# Patient Record
Sex: FEMALE | URBAN - METROPOLITAN AREA
[De-identification: names, ages, dates, MRNs, and addresses within clinical notes are randomized per-mention and may not be internally consistent; named-entity substitution may affect disease eponyms.]

---

## 2017-10-18 ENCOUNTER — RECORDS - HEALTHEAST (OUTPATIENT)
Dept: LAB | Facility: HOSPITAL | Age: 52
End: 2017-10-18

## 2017-10-19 LAB — HBV SURFACE AB SERPL IA-ACNC: NEGATIVE M[IU]/ML

## 2018-12-04 ENCOUNTER — OFFICE VISIT - HEALTHEAST (OUTPATIENT)
Dept: FAMILY MEDICINE | Facility: CLINIC | Age: 53
End: 2018-12-04

## 2018-12-04 DIAGNOSIS — G44.209 TENSION HEADACHE: ICD-10-CM

## 2018-12-04 DIAGNOSIS — Z12.31 VISIT FOR SCREENING MAMMOGRAM: ICD-10-CM

## 2018-12-04 DIAGNOSIS — M48.07 FORAMINAL STENOSIS OF LUMBOSACRAL REGION: ICD-10-CM

## 2018-12-04 DIAGNOSIS — M48.07 SPINAL STENOSIS OF LUMBOSACRAL REGION: ICD-10-CM

## 2018-12-04 DIAGNOSIS — R42 VERTIGO: ICD-10-CM

## 2018-12-04 DIAGNOSIS — G57.02 PIRIFORMIS SYNDROME OF LEFT SIDE: ICD-10-CM

## 2018-12-04 ASSESSMENT — MIFFLIN-ST. JEOR: SCORE: 1356.58

## 2019-07-22 ENCOUNTER — COMMUNICATION - HEALTHEAST (OUTPATIENT)
Dept: SCHEDULING | Facility: CLINIC | Age: 54
End: 2019-07-22

## 2020-02-03 ENCOUNTER — RECORDS - HEALTHEAST (OUTPATIENT)
Dept: LAB | Facility: CLINIC | Age: 55
End: 2020-02-03

## 2020-02-04 LAB — HBV SURFACE AB SERPL IA-ACNC: NEGATIVE M[IU]/ML

## 2020-08-03 ENCOUNTER — COMMUNICATION - HEALTHEAST (OUTPATIENT)
Dept: SCHEDULING | Facility: CLINIC | Age: 55
End: 2020-08-03

## 2020-09-22 ENCOUNTER — COMMUNICATION - HEALTHEAST (OUTPATIENT)
Dept: FAMILY MEDICINE | Facility: CLINIC | Age: 55
End: 2020-09-22

## 2020-09-22 ENCOUNTER — COMMUNICATION - HEALTHEAST (OUTPATIENT)
Dept: SCHEDULING | Facility: CLINIC | Age: 55
End: 2020-09-22

## 2020-09-24 ENCOUNTER — AMBULATORY - HEALTHEAST (OUTPATIENT)
Dept: NURSING | Facility: CLINIC | Age: 55
End: 2020-09-24

## 2020-09-24 DIAGNOSIS — Z23 IMMUNIZATION DUE: ICD-10-CM

## 2021-01-15 ENCOUNTER — HEALTH MAINTENANCE LETTER (OUTPATIENT)
Age: 56
End: 2021-01-15

## 2021-04-20 ENCOUNTER — OFFICE VISIT - HEALTHEAST (OUTPATIENT)
Dept: FAMILY MEDICINE | Facility: CLINIC | Age: 56
End: 2021-04-20

## 2021-04-20 DIAGNOSIS — M54.16 LUMBAR BACK PAIN WITH RADICULOPATHY AFFECTING LEFT LOWER EXTREMITY: ICD-10-CM

## 2021-04-21 ENCOUNTER — OFFICE VISIT - HEALTHEAST (OUTPATIENT)
Dept: PHYSICAL THERAPY | Facility: REHABILITATION | Age: 56
End: 2021-04-21

## 2021-04-21 DIAGNOSIS — M54.16 LEFT LUMBAR RADICULOPATHY: ICD-10-CM

## 2021-04-23 ENCOUNTER — COMMUNICATION - HEALTHEAST (OUTPATIENT)
Dept: ADMINISTRATIVE | Facility: CLINIC | Age: 56
End: 2021-04-23

## 2021-04-27 ENCOUNTER — HOSPITAL ENCOUNTER (OUTPATIENT)
Dept: MRI IMAGING | Facility: CLINIC | Age: 56
Discharge: HOME | End: 2021-04-27

## 2021-04-27 ENCOUNTER — COMMUNICATION - HEALTHEAST (OUTPATIENT)
Dept: FAMILY MEDICINE | Facility: CLINIC | Age: 56
End: 2021-04-27

## 2021-04-27 DIAGNOSIS — M54.16 LUMBAR BACK PAIN WITH RADICULOPATHY AFFECTING LEFT LOWER EXTREMITY: ICD-10-CM

## 2021-04-28 ENCOUNTER — OFFICE VISIT - HEALTHEAST (OUTPATIENT)
Dept: PHYSICAL THERAPY | Facility: REHABILITATION | Age: 56
End: 2021-04-28

## 2021-04-28 ENCOUNTER — OFFICE VISIT - HEALTHEAST (OUTPATIENT)
Dept: FAMILY MEDICINE | Facility: CLINIC | Age: 56
End: 2021-04-28

## 2021-04-28 ENCOUNTER — COMMUNICATION - HEALTHEAST (OUTPATIENT)
Dept: FAMILY MEDICINE | Facility: CLINIC | Age: 56
End: 2021-04-28

## 2021-04-28 DIAGNOSIS — M54.16 LEFT LUMBAR RADICULOPATHY: ICD-10-CM

## 2021-04-28 DIAGNOSIS — M54.42 CHRONIC LEFT-SIDED LOW BACK PAIN WITH LEFT-SIDED SCIATICA: ICD-10-CM

## 2021-04-28 DIAGNOSIS — G89.29 CHRONIC LEFT-SIDED LOW BACK PAIN WITH LEFT-SIDED SCIATICA: ICD-10-CM

## 2021-04-30 ENCOUNTER — OFFICE VISIT - HEALTHEAST (OUTPATIENT)
Dept: PHYSICAL THERAPY | Facility: REHABILITATION | Age: 56
End: 2021-04-30

## 2021-04-30 DIAGNOSIS — M54.16 LEFT LUMBAR RADICULOPATHY: ICD-10-CM

## 2021-05-04 ENCOUNTER — COMMUNICATION - HEALTHEAST (OUTPATIENT)
Dept: FAMILY MEDICINE | Facility: CLINIC | Age: 56
End: 2021-05-04

## 2021-05-05 ENCOUNTER — OFFICE VISIT - HEALTHEAST (OUTPATIENT)
Dept: PHYSICAL THERAPY | Facility: REHABILITATION | Age: 56
End: 2021-05-05

## 2021-05-05 DIAGNOSIS — M54.16 LEFT LUMBAR RADICULOPATHY: ICD-10-CM

## 2021-05-07 ENCOUNTER — HOSPITAL ENCOUNTER (OUTPATIENT)
Dept: PHYSICAL MEDICINE AND REHAB | Facility: CLINIC | Age: 56
Discharge: HOME | End: 2021-05-07
Attending: PHYSICAL MEDICINE & REHABILITATION

## 2021-05-07 DIAGNOSIS — G47.9 SLEEP DIFFICULTIES: ICD-10-CM

## 2021-05-07 DIAGNOSIS — M47.816 ARTHROPATHY OF LUMBAR FACET JOINT: ICD-10-CM

## 2021-05-07 DIAGNOSIS — M54.16 LUMBAR RADICULAR PAIN: ICD-10-CM

## 2021-05-07 DIAGNOSIS — M43.16 SPONDYLOLISTHESIS OF LUMBAR REGION: ICD-10-CM

## 2021-05-07 DIAGNOSIS — M48.061 STENOSIS OF LATERAL RECESS OF LUMBAR SPINE: ICD-10-CM

## 2021-05-07 DIAGNOSIS — M79.18 MYOFASCIAL PAIN: ICD-10-CM

## 2021-05-07 RX ORDER — NABUMETONE 500 MG/1
500-1000 TABLET, FILM COATED ORAL 2 TIMES DAILY PRN
Qty: 90 TABLET | Refills: 2 | Status: SHIPPED | OUTPATIENT
Start: 2021-05-07

## 2021-05-07 RX ORDER — GABAPENTIN 100 MG/1
CAPSULE ORAL
Qty: 90 CAPSULE | Refills: 2 | Status: SHIPPED | OUTPATIENT
Start: 2021-05-07

## 2021-05-07 ASSESSMENT — MIFFLIN-ST. JEOR: SCORE: 1388.9

## 2021-05-12 ENCOUNTER — OFFICE VISIT - HEALTHEAST (OUTPATIENT)
Dept: PHYSICAL THERAPY | Facility: REHABILITATION | Age: 56
End: 2021-05-12

## 2021-05-12 DIAGNOSIS — M54.16 LEFT LUMBAR RADICULOPATHY: ICD-10-CM

## 2021-05-21 ENCOUNTER — OFFICE VISIT - HEALTHEAST (OUTPATIENT)
Dept: PHYSICAL THERAPY | Facility: REHABILITATION | Age: 56
End: 2021-05-21

## 2021-05-21 DIAGNOSIS — M54.16 LEFT LUMBAR RADICULOPATHY: ICD-10-CM

## 2021-05-26 ENCOUNTER — RECORDS - HEALTHEAST (OUTPATIENT)
Dept: ADMINISTRATIVE | Facility: CLINIC | Age: 56
End: 2021-05-26

## 2021-05-27 ENCOUNTER — RECORDS - HEALTHEAST (OUTPATIENT)
Dept: ADMINISTRATIVE | Facility: CLINIC | Age: 56
End: 2021-05-27

## 2021-05-27 VITALS — HEIGHT: 64 IN | BODY MASS INDEX: 30.22 KG/M2 | WEIGHT: 177 LBS

## 2021-05-30 NOTE — TELEPHONE ENCOUNTER
"Thursday night migraine.  Went away Friday.  Also had lower back pain, \"pelvic area\".  Pain comes and goes.  Now pain is in lower back.  Both sides.  Pain \"sometimew sin front, sometimes in back\".    No recent constipation. Denies urinary symptoms.    No fever.    She asks triage what do I think it is?  I say its not known and she needs to be seen today in clinic.  She says she will call her [facility] clinic by her home for an OV.    Does not rate pain as severe.  IF it gets severe, nurse recommends ED.    Elicia Servin, RN, [facility] Nurse Triage/Med Refills RN     Reason for Disposition    Patient wants to be seen    Protocols used: BACK PAIN-A-OH      "

## 2021-06-02 VITALS — HEIGHT: 64 IN | BODY MASS INDEX: 28.85 KG/M2 | WEIGHT: 169 LBS

## 2021-06-05 VITALS
DIASTOLIC BLOOD PRESSURE: 68 MMHG | HEART RATE: 74 BPM | WEIGHT: 174.8 LBS | BODY MASS INDEX: 30 KG/M2 | SYSTOLIC BLOOD PRESSURE: 110 MMHG

## 2021-06-10 NOTE — TELEPHONE ENCOUNTER
RN Triage:     Patient calling in stating that she has a headache, stuffy nose, sore throat, upset stomach and nauseated. Left lower right kidney is painful, she has a left side sciatic pain. NO pain with urination, no blood in her urine. Symptoms started 2-3 days ago and getting worse. Tightness in the chest. NO difficulty breathing NO fever, able to take a deep breath.   She is a CNA at [employer]. She works in [employer]. She was supposed to work today and called in sick.     Advised to have patient speak with employee health per work flow.   Patient given the number and transferred also.   Elisabeth Roque RN, BSN [facility] Triage Nurse      Reason for Disposition    Chest pain or pressure    Additional Information    Negative: SEVERE difficulty breathing (e.g., struggling for each breath, speaks in single words)    Negative: Difficult to awaken or acting confused (e.g., disoriented, slurred speech)    Negative: Bluish (or gray) lips or face now    Negative: Shock suspected (e.g., cold/pale/clammy skin, too weak to stand, low BP, rapid pulse)    Negative: Sounds like a life-threatening emergency to the triager    Negative: [1] COVID-19 exposure AND [2] no symptoms    Negative: COVID-19 and Breastfeeding, questions about    Negative: [1] Adult with possible COVID-19 symptoms AND [2] triager concerned about severity of symptoms or other causes    Negative: SEVERE or constant chest pain or pressure (Exception: mild central chest pain, present only when coughing)    Negative: MODERATE difficulty breathing (e.g., speaks in phrases, SOB even at rest, pulse 100-120)    Negative: Patient sounds very sick or weak to the triager    Negative: MILD difficulty breathing (e.g., minimal/no SOB at rest, SOB with walking, pulse <100)    Protocols used: CORONAVIRUS (COVID-19) DIAGNOSED OR [id number] 5.16.20

## 2021-06-11 NOTE — TELEPHONE ENCOUNTER
Who is calling:  Patient  Reason for Call:  Patient would like to cancel today's phone appt. However I am unable to cancel due to it being set to arrived already. Please assist with canceling appt.   Date of last appointment with primary care: na  Okay to leave a detailed message: No

## 2021-06-11 NOTE — TELEPHONE ENCOUNTER
Calling to prepare for telephone visit today.  Patient reports she spoke with PCP last week and does not need today's visit.   notified and appointment cancelled.

## 2021-06-11 NOTE — TELEPHONE ENCOUNTER
THANH attempted to cx but unable to do so.  Told JENNIFER Mcbride that pt is NOT having phone call today with Dr. Wood.  Understood. Thanks.

## 2021-06-16 NOTE — TELEPHONE ENCOUNTER
Reason for Call:  Form, our goal is to have forms completed with 72 hours, however, some forms may require a visit or additional information.    Type of letter, form or note:  disability    Who is the form from?: Insurance comp- [employer]    Where did the form come from: form was faxed in    What clinic location was the form placed at?:  Clinic    Where the form was placed: Should be given to provider    What number is listed as a contact on the form? [phone]       Additional comments: They are going to be faxing in a disability form for Diego to fill out, form will be faxed to [phone].    Call taken on 4/23/2021 at 12:28 PM by Johanna De Paz

## 2021-06-16 NOTE — PATIENT INSTRUCTIONS - HE
Call [phone] to set up the MRI of your spine.    I sent a medication called Valium into your pharmacy to take the day of your MRI.  Take the medication with you.  You will need to have somebody drive you home from your MRI.    Note given for work.  You can of the next 2 weeks off from work.  Drop off the [facility] paperwork for me to complete when you can.    Referral to physical therapy is in.  I encourage you to schedule an appointment before you leave the clinic today.  Otherwise, you can call [phone] to make an appointment.

## 2021-06-16 NOTE — PROGRESS NOTES
Assessment & Plan     Lumbar back pain with radiculopathy affecting left lower extremity  She has yet to have a baseline MRI.  We ordered that today.  Depending on results, we may want to consider a referral to the spine clinic.  For now, referral to physical therapy.  See patient instructions below for plan of care    - MR Lumbar Spine Without Contrast  - diazePAM (VALIUM) 5 MG tablet  Dispense: 2 tablet; Refill: 0  - Ambulatory referral to PT/OT    Patient Instructions   Call [phone] to set up the MRI of your spine.    I sent a medication called Valium into your pharmacy to take the day of your MRI.  Take the medication with you.  You will need to have somebody drive you home from your MRI.    Note given for work.  You can of the next 2 weeks off from work.  Drop off the [facility] paperwork for me to complete when you can.    Referral to physical therapy is in.  I encourage you to schedule an appointment before you leave the clinic today.  Otherwise, you can call [phone] to make an appointment.      Review of external notes as documented in note  15 minutes spent on the date of the encounter doing chart review, history and exam, documentation and further activities per the note       Return in about 3 months (around 7/20/2021).    Leonid Ruff, CNP  [facility]   Carina Rosado is 55 y.o. and presents today for the following health issues   HPI     Patient has a long established history of chronic low back pain.  There is a mention of lumbosacral foraminal stenosis in her chart but did not see any prior MRI documentation listed.    She saw her PCP at [facility] last year.  Physical therapy was ordered.  A referral for an MRI was placed.    She never had the MRI done.  She was given some exercises to do physical therapy and so limited efficacy from that.    She has had multiple prednisone bursts for her lumbosacral pain in the past but is unsure if these  did much for her as far as her pain is concerned.    She has some intermittent numbness/tingling in her left heel.  She denies any bowel or bladder incontinence.  No saddle anesthesia.    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  She is requesting some time off from work today.  She does not have [place] paperwork with her today    Review of Systems  Negative      Objective    /68   Pulse 74   Wt 174 lb 12.8 oz (79.3 kg)   BMI 30.00 kg/m    Body mass index is 30 kg/m .  Physical Exam  She has some pain to the left paraspinal musculature with some tenderness over the area associated to the left SI joint.

## 2021-06-17 NOTE — PATIENT INSTRUCTIONS - HE
1. Nabumetone (which is an anti-inflammatory) medication is prescribed today. Take 1-2 tablets 2 times a day as needed for pain. This medication should be taken with food and water to prevent any stomach upset. Do not take ibuprofen/Advil/Motrin/Aleve/naproxen while you take Nabumetone. Please call if you have any side effects.    2.A physical therapy order was provided for you today.  You can schedule physical therapy before you leave today or will be contacted by physical therapy.  If nobody contacts you within 3 to 5 days, please contact the clinic at [phone].  It will be very important for you to do your physical therapy exercises on a regular basis to decrease your pain and prevent future pain flares.    3. Work restriction for 1 month    4.  Prescribed Gabapentin today, 100 mg tablets, to be titrated up to 3 tablets at bedtime as tolerated for your nerve pain. Please follow Gabapentin dosing chart below. If your pain is still significant in the mornings, you may then start taking the medication in the morning and then an afternoon dose.    Gabapentin 100mg Dosing Chart    DATE  MORNING AFTERNOON BEDTIME    Day 1 0 0 1    Day 2 0 0 1    Day 3 0 0 1    Day 4 0 0 2    Day 5 0 0 2    Day 6 0 0 2    Day 7 0 0 3    Day 8 0 0 3    Day 9 0 0 3    Day 10    3    Day 11   3    Day 12   3    Day 13       Day 14       Day 15       Day 16       Day 17       Day 18       Day 19       Day 20       Day 21       Day 22       Day 23       Day 24       Day 25       Day 26       Day 27        Continue medication, taking 3 capsules at bedtime    Please call if you have any questions regarding how to take your medication  Clinic Phone # [phone]

## 2021-06-17 NOTE — PROGRESS NOTES
[facility] Daily Progress Note    Patient Name: Carina Rosado  Date: 2021  Visit #: 45PTA visit #:  -  Referral Diagnosis: L lumbar radiculopathy  Referring provider: Leonid Ruff CNP  Visit Diagnosis:     ICD-10-CM    1. Left lumbar radiculopathy  M54.16        Past Medical History:   Diagnosis Date     Depression      GERD (gastroesophageal reflux disease)          Assessment:     10' late to appt.    Pt demonstrates improvement with gait and transfers today. She tolerated session well without increase in pain.     Treatments to date have included: nerve glides, stretching, core strengthening, and manual therapy.    HEP/POC compliance is  good .  Patient demonstrates understanding/independence with home program.  Patient is benefitting from skilled physical therapy and is making steady progress toward functional goals.  Patient is appropriate to continue with skilled physical therapy intervention, as indicated by initial plan of care.    Goal Status:  Pt. will demonstrate/verbalize independence in self-management of condition in : 12 weeks  Pt. will be independent with home exercise program in : 6 weeks  Pt. will have improved quality of sleep: with less pain;waking less times/night;in 6 weeks    Patient will decrease : VANGIE score;by _ points;for improved quality of life;for improved quality of function;in 12 weeks  by ___ points: 6  Pt will: have negative neural tension testing into L LE within 8 weeks in order to improve her QOL.      Plan / Patient Education:     Continue with initial plan of care.  Progress with home program as tolerated.    Plan for next visit: treadmill, re-check SI. mobs, review HEP. abd set +single leg ext    Subjective:     Pain Ratin/10    Pain is still there. [place] gives her headache. She is still off of work but thinks she will go back light duty at some point.       Objective:     Slump: + slump on L NT    Lumbar AROM: NT  Flex: to distal tibia  Ext: WFL  R SB:  "WFL  L SB: WFL  B rot: WFL    Gait: normal, non-antalgic  Transfers: normal, non-antalgic    Response to MT: no increase in pain       Treatment Today:       Exercises:  Exercise #1: seated nerve glides x20 on L  Comment #1: femoral n glides x20 on L  Exercise #2: SKTC and modified piriformis stretch 30\" holds x2-3 B  Comment #2: child's pose 30\" holds x3-terminated due to increase in pain  Exercise #3: standing L ext x10 with 5\" holds  Comment #3: clamshell 5' holds, x15-20 B  Exercise #4: supine hip add x20 with 5\" Holds       TREATMENT MINUTES COMMENTS   Evaluation     Self-care/ Home management     Manual therapy 8 In R S/L: Gr I-II lumbar rotational mobs. MFR to L glut/QL/lumbar paraspinals   Neuromuscular Re-education     Therapeutic Activity     Therapeutic Exercises 10 Discussed progress and activity modification at home. Treadmill at self-selected speed of 1.9 mph for subj info (not today as patient wore sandals into therapy. Instructed pt to wear tennis shoes to following appts). Reviewed HEP- see flow sheet for details.   Gait training     Modality__________________     Performance Test           Total 18    Blank areas are intentional and mean the treatment did not include these items.       Shanice Rand, PT, DPT  5/12/2021    "

## 2021-06-17 NOTE — PROGRESS NOTES
[facility] Daily Progress Note    Patient Name: Carina Rosado  Date: 2021  Visit #: 5  PTA visit #:  -  Referral Diagnosis: L lumbar radiculopathy  Referring provider: Leonid Ruff CNP  Visit Diagnosis:     ICD-10-CM    1. Left lumbar radiculopathy  M54.16        Past Medical History:   Diagnosis Date     Depression      GERD (gastroesophageal reflux disease)          Assessment:     Patient reports about 30% progress since beginning therapy. She would like to hold therapy, work on her exercises, and then schedule more visits later on. Is sleeping better and having less stiffness in her neck. Patient is returning to work part-time  with restrictions. Sees her doctor in  but doesn't know when. Patient instructed to follow up with PT when she knows her schedule.    Treatments to date have included: nerve glides, stretching, core strengthening, and manual therapy.    HEP/POC compliance is  good .  Patient demonstrates understanding/independence with home program.  Patient is benefitting from skilled physical therapy and is making steady progress toward functional goals.  Patient is appropriate to continue with skilled physical therapy intervention, as indicated by initial plan of care.    Goal Status:  Pt. will demonstrate/verbalize independence in self-management of condition in : 12 weeks;Progressing toward  Pt. will be independent with home exercise program in : 6 weeks;Met  Pt. will have improved quality of sleep: with less pain;waking less times/night;in 6 weeks;Met    Patient will decrease : VANGIE score;by _ points;for improved quality of life;for improved quality of function;in 12 weeks;Met  by ___ points: 6  Pt will: have negative neural tension testing into L LE within 8 weeks in order to improve her QOL. GOAL NOT MET      Plan / Patient Education:     Continue with initial plan of care.  Progress with home program as tolerated.        Subjective:     Pain Ratin/10 in low back,  "6-7/10 in L knee    Feeling a little better. Knee and back hurt. Going back to work 5/27 part-time with restrictions (isn't sure exactly what her restrictions are). Pt would like to work on her exercises and then schedule therapy after that. Sees doctor in June.      Objective:     Slump: + slump on L     Lumbar AROM: all WFL and pain-free except SB which increases some low back stiffness  Gait: normal, non-antalgic  Transfers: normal, non-antalgic    Modified Oswestry Low Back Pain Disablity Questionnaire  in %: 34  (prev VANGIE 34%)    Response to MT: no increase in pain       Treatment Today:       Exercises:  Exercise #1: seated nerve glides x20 on L  Comment #1: femoral n glides x20 on L  Exercise #2: SKTC and modified piriformis stretch 30\" holds x2-3 B  Comment #2: child's pose 30\" holds x3-terminated due to increase in pain  Exercise #3: standing L ext x10 with 5\" holds  Comment #3: clamshell 5' holds, x15-20 B  Exercise #4: supine hip add x20 with 5\" Holds       TREATMENT MINUTES COMMENTS   Evaluation     Self-care/ Home management     Manual therapy 12 In R S/L: Gr I-II lumbar rotational mobs. MFR to L glut/QL/lumbar paraspinals   Neuromuscular Re-education     Therapeutic Activity     Therapeutic Exercises 11 Discussed progress and activity modification at home. Treadmill at self-selected speed for warm-up and taking subj info. obj measures taken.   Gait training     Modality__________________     Performance Test           Total 23    Blank areas are intentional and mean the treatment did not include these items.       Shanice Rand, PT, DPT  5/21/2021    "

## 2021-06-17 NOTE — PROGRESS NOTES
Carina Rosado is a 55 y.o. female who is being evaluated via a billable telephone visit.      What phone number would you like to be contacted at? [phone]  How would you like to obtain your AVS? AVS Preference: Edgardo.    Assessment & Plan     Chronic left-sided low back pain with left-sided sciatica  We reviewed the results of her MRI today.  She is interested in referral to the spine clinic.  I encouraged her to continue with physical therapy.  - Ambulatory referral to [facility] [place]      Review of the result(s) of each unique test - MRI  10 minutes spent on the date of the encounter doing chart review, history and exam, documentation and further activities per the note       No follow-ups on file.    Leonid Ruff, [facility]    Subjective   Carina Rosado is 55 y.o. and presents today for the following health issues   HPI     Patient presents the clinic today for follow-up of her low back pain.    I saw her approximately 1 week ago.  Please see my note from 4/20 for more details on the specifics regarding the history of her back pain.    Today, patient states that she has had 2 sessions of physical therapy.  She is unsure if she is seen much benefit yet.    She did have the MRI of her lumbar spine, which I reviewed with her today.  There was evidence of multilevel spondylotic change, most prominent at L4-L5 where there was felt to be moderate to severe spinal canal stenosis.    She would like a note for work so that she can have an additional 2 weeks of rest.  She knows that she needs to avoid any prolonged sitting or laying, and stay as active as she can.    She is interested in a referral to the spine clinic.     Review of Systems  Negative          Phone call duration: 10 minutes

## 2021-06-17 NOTE — TELEPHONE ENCOUNTER
I tried to call and discuss the patient's MRI results with her.    If the patient calls back, please help her schedule a telephone visit with me so that we can discuss her MRI results and neck steps for her back pain

## 2021-06-17 NOTE — PROGRESS NOTES
[facility] Daily Progress Note    Patient Name: Carina Rosado  Date: 2021  Visit #: 2  PTA visit #:  -  Referral Diagnosis: L lumbar radiculopathy  Referring provider: Leonid Ruff CNP  Visit Diagnosis:     ICD-10-CM    1. Left lumbar radiculopathy  M54.16        No past medical history on file.      Assessment:   4' late to appt    Patient requiring max review of her 2 exercises today. She is only doing a few repetitions of each and only 2x/day (not as instructed). She is only icing 1x/day. We discussed the importance of compliance with her HEP, otherwise they will likely not bring her any relief of her symptoms. Progressed her HEP today with some gentle stretching. She states that she is now off of work for 2 weeks on disability.      HEP/POC compliance is  poor.  Patient demonstrates understanding/independence with home program.  Patient is benefitting from skilled physical therapy and is making steady progress toward functional goals.  Patient is appropriate to continue with skilled physical therapy intervention, as indicated by initial plan of care.    Goal Status:  Pt. will demonstrate/verbalize independence in self-management of condition in : 12 weeks  Pt. will be independent with home exercise program in : 6 weeks  Pt. will have improved quality of sleep: with less pain;waking less times/night;in 6 weeks    Patient will decrease : VANGIE score;by _ points;for improved quality of life;for improved quality of function;in 12 weeks  by ___ points: 6  Pt will: have negative neural tension testing into L LE within 8 weeks in order to improve her QOL.      Plan / Patient Education:     Continue with initial plan of care.  Progress with home program as tolerated.    Plan for next visit: treadmill, review HEP, mobs, abd set +single leg ext, clamshell    Subjective:     Pain Ratin/10    Pain in L leg and L arm today. Exercises are going well. Doing her HEP 2x/day and only 5-10 reps. Icing  "1x/day      Objective:     Slump: + slump on L  Lumbar AROM:  Flex to distal tibia, +gowlers  Ext: WFL, pain-free  R SB: WFL, pain-free  L SB: increase in pain, min-mod limited  B rot: WFL, pain-free    Max VC needed for review of HEP today      Treatment Today:       Exercises:  Exercise #1: seated nerve glides x20 on L  Comment #1: femoral n glides x20 on L  Exercise #2: SKTC and modified piriformis stretch 30\" holds x2-3 B  Comment #2: child's pose 30\" holds x3-terminated due to increase in pain  Exercise #3: standing L ext x10 with 5\" holds       TREATMENT MINUTES COMMENTS   Evaluation     Self-care/ Home management     Manual therapy     Neuromuscular Re-education     Therapeutic Activity     Therapeutic Exercises 23 Discussed progress. Objective measures taken. Progressed HEP- see flow sheet for details. Discussed the importance again of doing her HEP 5x/day. Reviewed HEP extensively. Treadmill at self-selected speed of 1.9 mph for subj info.   Gait training     Modality__________________     Performance Test           Total 23    Blank areas are intentional and mean the treatment did not include these items.       Shanice Rand, PT, DPT  4/28/2021    "

## 2021-06-17 NOTE — PROGRESS NOTES
[facility] Daily Progress Note    Patient Name: Carina Rosado  Date: 2021  Visit #: 4  PTA visit #:  -  Referral Diagnosis: L lumbar radiculopathy  Referring provider: Leonid Ruff CNP  Visit Diagnosis:     ICD-10-CM    1. Left lumbar radiculopathy  M54.16        No past medical history on file.      Assessment:     5' late to appt.  Pt appears to have improved mobility despite reporting no progress since beginning therapy. She reported increased pain after lumbar AROM testing. Treatments today have included: nerve glides, stretching, core strengthening, and manual therapy.      HEP/POC compliance is  good .  Patient demonstrates understanding/independence with home program.  Patient is benefitting from skilled physical therapy and is making steady progress toward functional goals.  Patient is appropriate to continue with skilled physical therapy intervention, as indicated by initial plan of care.    Goal Status:  Pt. will demonstrate/verbalize independence in self-management of condition in : 12 weeks  Pt. will be independent with home exercise program in : 6 weeks  Pt. will have improved quality of sleep: with less pain;waking less times/night;in 6 weeks    Patient will decrease : VANGIE score;by _ points;for improved quality of life;for improved quality of function;in 12 weeks  by ___ points: 6  Pt will: have negative neural tension testing into L LE within 8 weeks in order to improve her QOL.      Plan / Patient Education:     Continue with initial plan of care.  Progress with home program as tolerated.    Plan for next visit: treadmill, re-check SI. mobs, review HEP. abd set +single leg ext    Subjective:     Pain Ratin/10    Pain is the same. Compliant with her HEP. Denies numbness/tingling. Does have burning sensation into heel.     Off of work until       Objective:     Slump: + slump on L     Lumbar AROM: no increase in pain with any motions today  Flex: to distal tibia  Ext: WFL  R SB:  "WFL  L SB: WFL  B rot: WFL    Gait: normal, non-antalgic  Transfers: normal, non-antalgic    Response to MT: no increase in pain from MT but reported increase in pain from testing today      Treatment Today:       Exercises:  Exercise #1: seated nerve glides x20 on L  Comment #1: femoral n glides x20 on L  Exercise #2: SKTC and modified piriformis stretch 30\" holds x2-3 B  Comment #2: child's pose 30\" holds x3-terminated due to increase in pain  Exercise #3: standing L ext x10 with 5\" holds  Comment #3: clamshell 5' holds, x15-20 B  Exercise #4: supine hip add x20 with 5\" Holds       TREATMENT MINUTES COMMENTS   Evaluation     Self-care/ Home management     Manual therapy 8 In R S/L: Gr I-II lumbar rotational mobs. MFR to L glut/QL/lumbar paraspinals   Neuromuscular Re-education     Therapeutic Activity     Therapeutic Exercises 12 Discussed progress and activity modification at home. Treadmill at self-selected speed of 1.9 mph for subj info (not today as patient wore sandals into therapy. Instructed pt to wear tennis shoes to following appts). Progressed HEP- see flow sheet for details.   Gait training     Modality__________________     Performance Test           Total 20    Blank areas are intentional and mean the treatment did not include these items.       Shanice Rand, PT, DPT  5/5/2021    "

## 2021-06-17 NOTE — TELEPHONE ENCOUNTER
I completed some [place] paperwork for the patient.  It is in my out box.  Please fax the paperwork when able.

## 2021-06-17 NOTE — PROGRESS NOTES
[facility] Daily Progress Note    Patient Name: Carina Rosado  Date: 2021  Visit #: 3  PTA visit #:  -  Referral Diagnosis: L lumbar radiculopathy  Referring provider: Leonid Ruff CNP  Visit Diagnosis:     ICD-10-CM    1. Left lumbar radiculopathy  M54.16        No past medical history on file.      Assessment:     Pt comes in to day and reports increased compliance with her HEP. She does have slightly less pain today than last session, but she also states that her pain varies. Patient responded very well to manual therapy today with no pain in her back/leg after treatment and minimal pain in her heel. Will continue to focus on manual therapy and progression of her HEP in upcoming sessions.    HEP/POC compliance is  good .  Patient demonstrates understanding/independence with home program.  Patient is benefitting from skilled physical therapy and is making steady progress toward functional goals.  Patient is appropriate to continue with skilled physical therapy intervention, as indicated by initial plan of care.    Goal Status:  Pt. will demonstrate/verbalize independence in self-management of condition in : 12 weeks  Pt. will be independent with home exercise program in : 6 weeks  Pt. will have improved quality of sleep: with less pain;waking less times/night;in 6 weeks    Patient will decrease : VANGIE score;by _ points;for improved quality of life;for improved quality of function;in 12 weeks  by ___ points: 6  Pt will: have negative neural tension testing into L LE within 8 weeks in order to improve her QOL.      Plan / Patient Education:     Continue with initial plan of care.  Progress with home program as tolerated.    Plan for next visit: treadmill, mobs, abd set +single leg ext, clamshell    Subjective:     Pain Ratin/10    Doing her HEP now 5x/day. Pain in back is still very sore. Feels worse in the morning when she first gets out of bed. Pain in her knee and her heel are still burning.  "Heel/knee are not improving.    Neck is sore, but that isn't new      Objective:     Slump: + slump on L NT  Lumbar AROM: NT  Flex to distal tibia, +gowlers  Ext: WFL, pain-free  R SB: WFL, pain-free  L SB: increase in pain, min-mod limited  B rot: WFL, pain-free    Response to MT: feeling significantly better, minimal heel pain and no back/leg pain      Treatment Today:       Exercises:  Exercise #1: seated nerve glides x20 on L  Comment #1: femoral n glides x20 on L  Exercise #2: SKTC and modified piriformis stretch 30\" holds x2-3 B  Comment #2: child's pose 30\" holds x3-terminated due to increase in pain  Exercise #3: standing L ext x10 with 5\" holds       TREATMENT MINUTES COMMENTS   Evaluation     Self-care/ Home management     Manual therapy 16 In R S/L: Gr I-II lumbar rotational mobs. MFR to L glut/QL/lumbar paraspinals   Neuromuscular Re-education     Therapeutic Activity     Therapeutic Exercises 8 Discussed progress and activity modification at home. Treadmill at self-selected speed of 1.9 mph for subj info.   Gait training     Modality__________________     Performance Test           Total 24    Blank areas are intentional and mean the treatment did not include these items.       Shanice Rand, PT, DPT  4/30/2021    "

## 2021-06-21 NOTE — LETTER
Letter by Leonid Ruff CNP at      Author: Leonid Ruff CNP Service: -- Author Type: --    Filed:  Encounter Date: 4/28/2021 Status: (Other)         April 28, 2021     Patient: Carina Rosado   YOB: 1965   Date of Visit: 4/28/2021       To Whom It May Concern:    It is my medical opinion that Carina Rosado should remain out of work until 5/11/2021.    If you have any questions or concerns, please don't hesitate to call.    Sincerely,        Electronically signed by Leonid Ruff CNP

## 2021-06-21 NOTE — LETTER
Letter by Magen Blair DO at      Author: Magen Blair DO Service: -- Author Type: --    Filed:  Date of Service:  Status: (Other)       May 7, 2021     Patient: Carina Rosado   YOB: 1965   Date of Visit: 5/7/2021       To Whom It May Concern:    It is my medical opinion that Carina Rosado may return to light duty on 5/11/21 with the following restrictions: 20 pound lift limit and no sit or stand for more than 1 hour with 10 minutes to change position. Valid until follow up 1 month.    If you have any questions or concerns, please don't hesitate to call.    Sincerely,        Magen Blair DO

## 2021-06-21 NOTE — LETTER
Letter by Leonid Ruff CNP at      Author: Leonid Ruff CNP Service: -- Author Type: --    Filed:  Encounter Date: 4/28/2021 Status: (Other)         Carina Rosado  [address]  [address]  [facility] [address]             April 28, 2021         Dear Ms. Rosado,    Below are the results from your recent visit:    Resulted Orders   MR Lumbar Spine Without Contrast    Narrative    EXAM: MR LUMBAR SPINE WO CONTRAST  LOCATION: [facility]  DATE/TIME: 4/27/2021 6:07 PM    INDICATION: Back pain.  COMPARISON: Lumbar spine MRI dated 10/29/2018  TECHNIQUE: Routine Lumbar Spine MRI without IV contrast.    FINDINGS:   The spine is imaged from inferior aspect of T11-S4. Nomenclature is based on 5 lumbar type vertebral bodies. Straightening of the usual spinal curvature and narrowing of the interspinous distance with 2 mm retrolisthesis of T12 over L1, 2 mm   retrolisthesis of L1 over L2, and 3 mm anterolisthesis of L4 over L5. No evidence of acute fracture. No suspicious marrow signal alteration. The conus medullaris terminates at L1-L2 interspace. The visualized spinal cord is unremarkable. The   paravertebral structures are unremarkable. Sacral Tarlov cysts, otherwise the visualized bony pelvis is unremarkable.    T12-L1: 2 mm retrolisthesis of T12 over L1 and moderate disc height loss with symmetric disc bulge in combination with mild facet arthropathy results in mild bilateral neural foraminal narrowing without significant spinal canal stenosis.    L1-L2: 2 mm retrolisthesis L1 over L2 and mild disc height loss with symmetric disc bulge in combination with mild facet arthropathy results in mild bilateral neural foraminal narrowing without significant spinal canal stenosis.    L2-L3: Mild disc height loss with symmetric disc bulge in combination with mild facet arthropathy results in mild bilateral neural foraminal narrowing without significant spinal canal stenosis.    L3-L4: Mild to  moderate disc height loss with symmetric disc bulge in combination with mild to moderate facet arthropathy mild degenerative thickening of ligamentum flavum results in mild spinal canal stenosis with mild bilateral neural foraminal   narrowing.    L4-L5: 3 mm anterolisthesis of L4 over L5 with mild disc height loss and symmetric disc bulge in combination with severe facet arthropathy and moderate degenerative thickening of ligamenta flava results in moderate to severe spinal canal stenosis and   mild to moderate bilateral neural foraminal narrowing.    L5-S1: Mild disc height loss in combination with moderate facet arthropathy results in mild bilateral neural foraminal narrowing without significant spinal canal stenosis.      Impression    1.  Multilevel spondylotic change of the lumbar spine as described, most prominent at L4-L5 where there is moderate to severe spinal canal stenosis. Overall these findings are not significantly changed since 10/29/2018       Your MRI revealed some fairly significant narrowing of the spinal canal in your lower back.  It is unclear if this could be a major contributor for your low back pain.  I recommendation is to continue with physical therapy.    If you would like to see a spine specialist, I can place a referral to our spine care clinic.  Please let me know.    Please call with questions or contact us using [employer].    Sincerely,        Electronically signed by Leonid Ruff CNP

## 2021-06-21 NOTE — LETTER
Letter by Leonid Ruff CNP at      Author: Leondi Ruff CNP Service: -- Author Type: --    Filed:  Encounter Date: 4/20/2021 Status: (Other)         April 20, 2021     Patient: Carina Rosado   YOB: 1965   Date of Visit: 4/20/2021       To Whom It May Concern:    It is my medical opinion that Carina Rosado should remain out of work until after May 4, 2021    If you have any questions or concerns, please don't hesitate to call.    Sincerely,        Electronically signed by Leonid Ruff CNP

## 2021-06-22 NOTE — PROGRESS NOTES
"S:  53-year-old female who comes in today with complaints of  #1 left-sided pain in her lower back that radiates intermittently down into the leg.  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  She denies any weakness on that side.  She denies any recent injury.  She denies any falls.  She denies any difficulty controlling her bowels or her bladder.  She did have a recent MRI that demonstrated right-sided spinal stenosis and foraminal stenosis that is very mild.  She does not have any symptoms on the right.  She has tried physical therapy in the past with no improvement.  She denies any sensory changes in her lower extremities.    #2 she has questions about her toenails.  She has seen dermatology in the past for some discoloration in her toenails as well as some thickening of her toenails.  They did do fungal cultures on a piece of her toenail that were came back negative.  She was told not to worry about the darkening changes over her toenails.  This slowly seems to be getting worse.  It has been present for more than 3 years.    #3 vertigo..  She was diagnosed with vertigo in the emergency room.  She was given meclizine but this just seemed to worsen her vertigo.  Tends to be worse when she is turning her head or rolling over in bed.  Now it lasts for only brief periods of time.  She denies any falls.  It did start acutely one day.  She denies any persistent headaches.  She does have intermittent headaches that seem to come from the back of her head.  She denies any vision changes.  She denies any difficulty in finding her words or speaking.  She denies any unusual numbness, tingling, weakness in any part of her body.  She denies any ear pain or difficulty with hearing.    O:  BP 98/72   Pulse 72   Temp 98.2  F (36.8  C) (Oral)   Resp 16   Ht 5' 4\" (1.626 m)   Wt 169 lb (76.7 kg)   SpO2 95%   BMI 29.01 kg/m    Gen: no acute distress  Heent;  Conjunctiva and sclera are normal.  Bilateral tm's are " normal.  Oropharynx is normal. Bilateral nares are normal.      Neuro:  No nystagmus noted on exam.  Cranial nerves intact.  Eomi.  Perrla.  Palate elevates normally.  Muscle strength is normal.  Reflexes are normal.  Finger to nose is normal.  Romberg is negative.  Gait is normal.  Sensation is intact in all extremities.  Rapid alternative movements are normal.      Neck:  Supple, no lad, no carotid bruits  Heart:  Regular rate and rhythm.  No m/r/g  Lungs: cta bilaterally, no wheezes or rhonchi.  Good air inspiration  Abdomen:  No masses or organomegaly  Extremities:  No edema.     Ms: She has tenderness along the piriformis muscle of the left side.  Palpation of this reproduces her pain.  She also has some mild tenderness along her left IT band.  She has tenderness along the palpation of her neck muscles left greater than right.  She also has significant tenderness in her occipital trigger points.  Palpation of these reproduce her exact headaches.  Skin: No rashes noted.  She has some mild hyperpigmentation of all of her nails.  This is occurring laterally.  No black nails.        Patient Active Problem List   Diagnosis     External hemorrhoid     Iron deficiency anemia     Screening for malignant neoplasm of cervix     Spinal stenosis of lumbosacral region     Foraminal stenosis of lumbosacral region     Current Outpatient Medications on File Prior to Visit   Medication Sig Dispense Refill     meclizine (ANTIVERT) 25 mg tablet Take 1 tablet (25 mg total) by mouth 4 (four) times a day. 28 tablet 0     No current facility-administered medications on file prior to visit.           No results found for this or any previous visit (from the past 48 hour(s)).      Assessment/Plan:  1. Spinal stenosis of lumbosacral region      2. Foraminal stenosis of lumbosacral region      3. Visit for screening mammogram      4.  Tension headache  I did show her to do some massage therapy with a ball at home as well as some  stretching to help improve her tension headaches.  She will continue with the acupuncture that she has already been doing.  She will follow-up if she has any worsening symptoms.  5.  Piriformis syndrome  I did show her stretches and exercises to do today.  If this does not improve her symptoms and I can send her for more physical therapy.  She will continue with her acupuncture.  6.  vertigo  I gave her instructions on how to do Epley maneuvers at home.  If her vertigo worsens she will likely need to have imaging of her head.  If she develops any unilateral symptoms she should also let me know as that would also prompt imaging.    Jennifer Wood   12/4/2018 5:54 PM

## 2021-06-30 NOTE — PROGRESS NOTES
Progress Notes by Shanice Rand PT at 4/21/2021 12:00 PM     Author: Shanice Rand PT Service: -- Author Type: Physical Therapist    Filed: 4/21/2021 12:55 PM Encounter Date: 4/21/2021 Status: Attested    : Shanice Rand PT (Physical Therapist) Cosigner: Leonid Ruff CNP at 4/21/2021 12:56 PM    Attestation signed by Leonid Ruff CNP at 4/21/2021 12:56 PM    Looks great!                    [facility] Certification Request    April 21, 2021      Patient: Carina Rosado  MR Number: [id number]  YOB: 1965  Date of Visit: 4/21/2021      Dear Dr. Ruff,    Thank you for this referral.   We are seeing Carina Rosado for Physical Therapy of left lumbar radiculopathy.    Medicare and/or Medicaid requires physician review and approval of the treatment plan. Please review the plan of care and verify that you agree with the therapy plan of care by co-signing this note.      Plan of Care  Authorization / Certification Start Date: 04/21/21  Authorization / Certification End Date: 07/21/21  Authorization / Certification Number of Visits: 12  Communication with: Referral Source  Patient Related Instruction: Nature of Condition;Precautions;Next steps;Treatment plan and rationale;Expected outcome;Self Care instruction;Basis of treatment;Body mechanics;Posture  Times per Week: 1-2  Number of Weeks: 12  Number of Visits: 12  Discharge Planning: when PT goals are met  Precautions / Restrictions : none per chart  Therapeutic Exercise: ROM;Stretching;Strengthening  Neuromuscular Reeducation: core;posture  Manual Therapy: soft tissue mobilization;myofascial release;joint mobilization;muscle energy  Equipment: theraband      Goals:  Pt. will demonstrate/verbalize independence in self-management of condition in : 12 weeks  Pt. will be independent with home exercise program in : 6 weeks  Pt. will have improved quality of sleep: with less pain;waking less times/night;in 6 weeks    Patient will  decrease : VANGIE score;by _ points;for improved quality of life;for improved quality of function;in 12 weeks  by ___ points: 6  Pt will: have negative neural tension testing into L LE within 8 weeks in order to improve her QOL.        If you have any questions or concerns, please don't hesitate to call.    Sincerely,      Shanice Rand, PT, DPT        Physician recommendation:     ___ Follow therapist's recommendation        ___ Modify therapy      *Physician co-signature indicates they certify the need for these services furnished within this plan and while under their care.        [facility]  [place] Initial Evaluation    Patient Name: Carina Rosado  Date of evaluation: 4/21/2021  Referral Diagnosis: left lumbar radiculopathy  Referring provider: Leonid Ruff CNP  Visit Diagnosis:     ICD-10-CM    1. Left lumbar radiculopathy  M54.16        History reviewed. No pertinent past medical history.    Assessment:      Carina Rosado is a 55 y.o. female who presents to therapy today with chief complaints of bilateral low back pain with symptoms radiating down her left leg. Symptoms began years ago without known injury/onset. Difficulty with standing long periods, stairs, bending, pushing weight like wheelchairs/stretches, sleeping, yard/house work due to pain.  Pain symptoms are not improving.  Patient demonstrates signs and sx consistent with lumbar disc herniation. PT POC and goals have been discussed with patient and She  is agreeable to these. Patient appears motivated for physical therapy and is appropriate for skilled therapy services.    The POC is dynamic and will be modified on an ongoing basis.  Barriers to achieving goals as noted in the assessment section may affect outcome.  Prognosis to achieve goals is  fair   Pt. is appropriate for skilled PT intervention as outlined in the Plan of Care (POC).  Pt. is a good candidate for skilled PT services to improve pain levels and  function.    Goals:  Pt. will demonstrate/verbalize independence in self-management of condition in : 12 weeks  Pt. will be independent with home exercise program in : 6 weeks  Pt. will have improved quality of sleep: with less pain;waking less times/night;in 6 weeks    Patient will decrease : VANGIE score;by _ points;for improved quality of life;for improved quality of function;in 12 weeks  by ___ points: 6  Pt will: have negative neural tension testing into L LE within 8 weeks in order to improve her QOL.      Patient's expectations/goals are realistic.    Barriers to Learning or Achieving Goals:  Chronicity of the problem.       Plan / Patient Instructions:      Plan of Care:   Authorization / Certification Start Date: 04/21/21  Authorization / Certification End Date: 07/21/21  Authorization / Certification Number of Visits: 12  Communication with: Referral Source  Patient Related Instruction: Nature of Condition;Precautions;Next steps;Treatment plan and rationale;Expected outcome;Self Care instruction;Basis of treatment;Body mechanics;Posture  Times per Week: 1-2  Number of Weeks: 12  Number of Visits: 12  Discharge Planning: when PT goals are met  Precautions / Restrictions : none per chart  Therapeutic Exercise: ROM;Stretching;Strengthening  Neuromuscular Reeducation: core;posture  Manual Therapy: soft tissue mobilization;myofascial release;joint mobilization;muscle energy  Equipment: theraband      POC and pathology of condition were reviewed with patient.  Pt. is in agreement with the Plan of Care  A Home Exercise Program (HEP) was initiated today.  Pt. was instructed in exercises by PT and patient was given a handout with detailed instructions.    Plan for next visit: treadmill, SKTC, piriformis stretch, clamshell, mobs     Subjective:       Social information:   Occupation: CNA   Work Status: 24 hours/week    History of Present Illness:    Carina Rosado is a 55 y.o. female who presents to therapy today with  complaints of chronic left-sided low back pain with symptoms that radiate into her left heel. Pain goes down the back of her leg. Her knees also hurt. Pain is starting to go into her right side. Date of onset/duration of symptoms is unclear. She's had back pain for years. Symptoms are intermittent. Has tried icing, stretching, and massage. Has had PT in the past for her pain. Denies numbness/tingling. Pain even goes to her neck sometimes.    Pain Ratin  Pain rating at best: 5  Pain rating at worst: 10  Pain description: burning    Functional limitations are described as occurring with: standing long periods, stairs, bending, pushing weight like wheelchairs/stretches, sleeping, yard/house work         Objective:      Note: Items left blank indicates the item was not performed or not indicated at the time of the evaluation.    Patient Outcome Measures :    Modified Oswestry Low Back Pain Disablity Questionnaire  in %: 34     Scores range from 0-100%, where a score of 0% represents minimal pain and maximal function. The minimal clinically important difference is a score reduction of 12%.    Examination  1. Left lumbar radiculopathy         Involved side: Left  Posture Observation: fair  Gait: normal, no assistive device, non-antalgic    Lumbar ROM:  All motions painful  Date:    *Indicate scale AROM   Lumbar Flexion Distal tibia, increase in LBP. +gowlers   Lumbar Extension Min-mod limited, increase in LBP    Right Left   Lumbar Sidebending Increase in R LBP, to knee Increase in L LBP, to knee   Lumbar Rotation WFL WFL, increase in L lower back ER   Thoracic Flexion    Thoracic Extension    Thoracic Sidebending     Thoracic Rotation       Lower Extremity Strength:    Date:    LE strength/5 Right Left   Hip Flexion (L1-3)     Hip Extension (L5-S1)     Hip Abduction (L4-5)     Hip Adduction (L2-3)     Hip External Rotation     Hip Internal Rotation     Knee Extension (L3-4)     Knee Flexion     Ankle Dorsiflexion  (L4-5)     Great Toe Extension (L5)     Ankle Plantar flexion (S1)     Abdominals      Sensation    Intact per subj       Reflex Testing  Lumbar Dermatomes Right Left UE Reflexes Right Left   Iliac Crest and Groin (L1)   Biceps (C5-6)     Anterior Medial Thigh (L2)   Brachioradialis (C5-6)     Anterior Thigh, Medial Epicondyle Femur (L3)   Triceps (C7-8)     Lateral Thigh, Anterior Knee, Medial Leg/Malleolus (L4)   Hoffmanns test     Lateral Leg, Dorsal Foot (L5)   LE Reflexes     Lateral Foot (S1)   Patellar (L3-4)     Posterior Leg (S2)   Achilles (S1-2)     Other:   Babinski Response       Palpation: tender L1-3 spinous processes, L4-5 spinous process, L PSIS, B glutes    Lumbar Special Tests:     Lumbar Special Tests Right Left SI Tests Right  Left   Quadrant test   SI Compression     Straight leg raise - - SI Distraction     Crossover response - - POSH Test - -   Slump - + Sacral Thrust     Sit-up test  PATRICIA - -   Trunk extensor endurance test  Resisted Abduction     Prone instability test  Other: femoral nerve testing + -   Pubic shotgun  Other: hip scour - -       LE Screen:  B hip PROM: WFL, pain-free    Treatment Today     TREATMENT MINUTES COMMENTS   Evaluation 18 Discussed PT POC and pathology of condition.    Self-care/ Home management 12 Answered patient questions regarding management of condition. Recommend patient ice daily. Reviewed activity modifications.   Manual therapy     Neuromuscular Re-education     Therapeutic Activity     Therapeutic Exercises 10 Began HEP:  Exercises:  Exercise #1: seated nerve glides x20 on L  Comment #1: femoral n glides x20 on L     Rec pt stop doing other exercises on her own that she's been doing for now.   Gait training     Modality__________________                Total 40    Blank areas are intentional and mean the treatment did not include these items.          PT Evaluation Code: (Please list factors)  Patient History/Comorbidities: see PMH  Examination: LBP w/  L radiculopathy   Clinical Presentation: stable  Clinical Decision Making: low    Patient History/  Comorbidities Examination  (body structures and functions, activity limitations, and/or participation restrictions) Clinical Presentation Clinical Decision Making (Complexity)   No documented Comorbidities or personal factors 1-2 Elements Stable and/or uncomplicated Low   1-2 documented comorbidities or personal factor 3 Elements Evolving clinical presentation with changing characteristics Moderate   3-4 documented comorbidities or personal factors 4 or more Unstable and unpredictable High              Shanice Rand PT, DPT  4/21/2021  12:07 PM

## 2021-09-19 ENCOUNTER — HEALTH MAINTENANCE LETTER (OUTPATIENT)
Age: 56
End: 2021-09-19

## 2022-03-06 ENCOUNTER — HEALTH MAINTENANCE LETTER (OUTPATIENT)
Age: 57
End: 2022-03-06

## 2022-11-20 ENCOUNTER — HEALTH MAINTENANCE LETTER (OUTPATIENT)
Age: 57
End: 2022-11-20

## 2023-04-15 ENCOUNTER — HEALTH MAINTENANCE LETTER (OUTPATIENT)
Age: 58
End: 2023-04-15

## 2024-04-13 ENCOUNTER — HEALTH MAINTENANCE LETTER (OUTPATIENT)
Age: 59
End: 2024-04-13